# Patient Record
Sex: MALE | Race: BLACK OR AFRICAN AMERICAN
[De-identification: names, ages, dates, MRNs, and addresses within clinical notes are randomized per-mention and may not be internally consistent; named-entity substitution may affect disease eponyms.]

---

## 2024-09-14 ENCOUNTER — APPOINTMENT (OUTPATIENT)
Dept: ORTHOPEDIC SURGERY | Facility: CLINIC | Age: 29
End: 2024-09-14

## 2024-11-19 ENCOUNTER — EMERGENCY (EMERGENCY)
Facility: HOSPITAL | Age: 29
LOS: 0 days | Discharge: HOME | End: 2024-11-19
Attending: EMERGENCY MEDICINE

## 2024-11-19 VITALS
DIASTOLIC BLOOD PRESSURE: 88 MMHG | OXYGEN SATURATION: 99 % | SYSTOLIC BLOOD PRESSURE: 146 MMHG | TEMPERATURE: 98 F | RESPIRATION RATE: 18 BRPM | HEART RATE: 87 BPM

## 2024-11-19 PROCEDURE — 99284 EMERGENCY DEPT VISIT MOD MDM: CPT

## 2024-11-19 RX ORDER — AMOXICILLIN 500 MG
1 CAPSULE ORAL
Qty: 20 | Refills: 0
Start: 2024-11-19 | End: 2024-11-28

## 2024-11-19 RX ORDER — AMOXICILLIN AND CLAVULANATE POTASSIUM 600; 42.9 MG/5ML; MG/5ML
1 POWDER, FOR SUSPENSION ORAL ONCE
Refills: 0 | Status: COMPLETED | OUTPATIENT
Start: 2024-11-19 | End: 2024-11-19

## 2024-11-19 RX ORDER — IBUPROFEN 200 MG
800 TABLET ORAL ONCE
Refills: 0 | Status: COMPLETED | OUTPATIENT
Start: 2024-11-19 | End: 2024-11-19

## 2024-11-19 RX ORDER — IBUPROFEN 200 MG
1 TABLET ORAL
Qty: 15 | Refills: 0
Start: 2024-11-19 | End: 2024-11-23

## 2024-11-19 RX ADMIN — AMOXICILLIN AND CLAVULANATE POTASSIUM 1 TABLET(S): 600; 42.9 POWDER, FOR SUSPENSION ORAL at 20:22

## 2024-11-19 RX ADMIN — Medication 800 MILLIGRAM(S): at 20:22

## 2024-11-19 NOTE — ED PROVIDER NOTE - OBJECTIVE STATEMENT
29y pt with noPMHx present with tooth pain of tooth #32. Pt states a couple years ago he had a partial root canal, but due to insurance was unable to compelte the surgery. Back in June of this year he went to the dentist who stated he needed to complete the surgery and so he went to an oral surgeon last month that stated he had a cyst attached to his tooth. He presents today with pain, and took Tylenol Extra Strength with no relief. Pt denies nausea, vomiting, fevers, HA.

## 2024-11-19 NOTE — ED PROVIDER NOTE - PATIENT PORTAL LINK FT
You can access the FollowMyHealth Patient Portal offered by [facility] by registering at the following website: [email or web address]. By joining [employer]’s FollowMyHealth portal, you will also be able to view your health information using other applications (apps) compatible with our system.

## 2024-11-19 NOTE — ED PROVIDER NOTE - NSFOLLOWUPINSTRUCTIONS_ED_ALL_ED_FT
FOLLOW UP WITH YOUR PRIMARY DOCTOR AND THE DENTAL CLINIC TOMORROW.     Dental Pain    Dental pain (toothache) may be caused by many things including tooth decay (cavities or caries), abscess or infection, injury, or the reason may be unknown. Your pain may only occur when you are chewing, are exposed to hot or cold temperature, are eating or drinking sugary foods or beverages, or your pain may be constant. If you were prescribed an antibiotic medicine, finish all of it even if you start to feel better. Rinsing your mouth with salt water or applying ice to the painful area of your face may help with the pain.    SEEK IMMEDIATE MEDICAL CARE IF YOU HAVE THE FOLLOWING SYMPTOMS: unable to open mouth, trouble breathing or swallowing, fever, or swelling of the face, neck or jaw.

## 2024-11-19 NOTE — ED PROVIDER NOTE - CLINICAL SUMMARY MEDICAL DECISION MAKING FREE TEXT BOX
29-year-old male no significant past medical history complains of pain to tooth #32.  Has had dental work before but insurance issues limited his follow-up.  On exam there a tender swelling in the region of #31/#32.  Dental evaluated and drained the swelling, recommended antibiotics and close follow-up.

## 2024-11-19 NOTE — ED PROVIDER NOTE - ADDITIONAL NOTES AND INSTRUCTIONS:
Mr Grover was seen in the Emergency Department on 11/19/24 and can return to school or work by the listed date with activity as tolerated.

## 2024-11-19 NOTE — ED PROVIDER NOTE - PHYSICAL EXAMINATION
VITAL SIGNS: I have reviewed nursing notes and confirm.  CONSTITUTIONAL: well-appearing, non-toxic, NAD  SKIN: Warm dry, normal skin turgor  HEAD: NCAT  EYES: EOMI, PERRLA, no scleral icterus  ENT: Moist mucous membranes, normal pharynx with no erythema or exudates  ORAL CAVITY: TTP, abscess with tooth #32   NECK: Supple; non tender. Full ROM. No cervical LAD  CARD: RRR, no murmurs, rubs or gallops  RESP: clear to ausculation b/l.  No rales, rhonchi, or wheezing.  ABD: soft, + BS, non-tender, non-distended, no rebound or guarding. No CVA tenderness  EXT: Full ROM, no bony tenderness, no pedal edema, no calf tenderness  NEURO: normal motor. normal sensory

## 2024-11-19 NOTE — CONSULT NOTE ADULT - SUBJECTIVE AND OBJECTIVE BOX
Patient is a 29y old  Male who presents with a chief complaint of pain and swelling of LR quadrant    HPI:      PAST MEDICAL & SURGICAL HISTORY:    ( -  ) heart valve replacement  (-   ) joint replacement  (  - ) pregnancy    MEDICATIONS  (STANDING):    MEDICATIONS  (PRN):      Allergies    No Known Allergies    Intolerances        FAMILY HISTORY:      *SOCIAL HISTORY: ( -  ) Tobacco; ( -  ) ETOH    *Last Dental Visit:last month    Vital Signs Last 24 Hrs  T(C): 36.6 (19 Nov 2024 18:54), Max: 36.6 (19 Nov 2024 18:54)  T(F): 97.8 (19 Nov 2024 18:54), Max: 97.8 (19 Nov 2024 18:54)  HR: 87 (19 Nov 2024 18:54) (87 - 87)  BP: 146/88 (19 Nov 2024 18:54) (146/88 - 146/88)  BP(mean): --  RR: 18 (19 Nov 2024 18:54) (18 - 18)  SpO2: 99% (19 Nov 2024 18:54) (99% - 99%)    Parameters below as of 19 Nov 2024 18:54  Patient On (Oxygen Delivery Method): room air        LABS:                  EOE:  TMJ ( -  ) clicks                     ( -  ) pops                     ( -  ) crepitus             Mandible WNL             Facial bones and MOM grossly intact             ( -  ) trismus             ( -  ) lymphadenopathy             ( +  ) swelling             ( + ) asymmetry             ( +  ) palpation             ( -  ) dyspnea             ( -  ) dysphagia             ( -  ) loss of consciousness    IOE:  permanent dentition: grossly intact                   hard/soft palate:  ( -  ) palatal torus, No pathology noted           tongue/FOM No pathology noted           labial/buccal mucosa No pathology noted           ( -  ) percussion           ( +  ) palpation           ( +  ) swelling            ( -  ) abscess           ( -  ) sinus tract    Dentition present: Yes  Mobility: None  Caries: None        *DENTAL RADIOGRAPHS:None    RADIOLOGY & ADDITIONAL STUDIES:None    *ASSESSMENT:Upon extra-oral exam patient had swelling in the lower right quadrant that was tender upon palpation. Right and left inferior border of the mandible were palpated, and pain on LR. No sign of dysphagia, dyspnea, trismus nor dysphnonia . Upon extraocular muscle examination, patient had no difficulty in moving both eyes in any direction. Upon intra-oral examination, patient had vestibular swelling in LR, apical to teeth #31. Patient had pain upon palpation of the LR buccal vestibule. Uvula was positioned at midline. FOM was soft upon palpation. couple of missing teeth based on intraoral exa      1carpules of 2% lidocaine, 1:100K epi was given as local infiltration at LR. #15 scaple blade used for incision and curved hemostat and suction tips, and irrigated with saline.No pus observed during the procedure. Post-op instruction given. suggest to prescribe Pain meds and antibiotics. Advise pt to come back on next available day for a dental check-up. Patient understood.      *PLAN:Follow-up with dentist    PROCEDURE:   Verbal and written consent given.  Anesthesia: Yes  Treatment: I&D    RECOMMENDATIONS:  1) Soft diet  2) Dental F/U with outpatient dentist for comprehensive dental care.   3) If any difficulty swallowing/breathing, fever occur, return to ER.     Resident Name, pager #[phone] Suzie Dickerson

## 2024-11-19 NOTE — ED PROVIDER NOTE - NSFOLLOWUPCLINICS_GEN_ALL_ED_FT
[facility]  Dental  [address] [place] [id number]  Phone: [phone]  Fax: